# Patient Record
Sex: FEMALE | Race: WHITE
[De-identification: names, ages, dates, MRNs, and addresses within clinical notes are randomized per-mention and may not be internally consistent; named-entity substitution may affect disease eponyms.]

---

## 2018-03-07 ENCOUNTER — HOSPITAL ENCOUNTER (OUTPATIENT)
Dept: HOSPITAL 92 - L&D/OP | Age: 23
Discharge: HOME | End: 2018-03-07
Attending: OBSTETRICS & GYNECOLOGY
Payer: COMMERCIAL

## 2018-03-07 VITALS — BODY MASS INDEX: 36.2 KG/M2

## 2018-03-07 DIAGNOSIS — O99.89: Primary | ICD-10-CM

## 2018-03-07 DIAGNOSIS — M54.5: ICD-10-CM

## 2018-03-07 DIAGNOSIS — Z79.899: ICD-10-CM

## 2018-03-07 DIAGNOSIS — Z3A.33: ICD-10-CM

## 2018-03-07 DIAGNOSIS — R10.2: ICD-10-CM

## 2018-03-07 LAB
BACTERIA UR QL AUTO: (no result) HPF
CRYSTAL-AUWI FLAG: 0.2 (ref 0–15)
FFN INTERNAL QC ANALYZER: (no result)
FFN INTERNAL QC CASSETTE: (no result)
HEV IGM SER QL: 6 (ref 0–7.99)
HYALINE CASTS #/AREA URNS LPF: (no result) LPF
PATHC CAST-AUWI FLAG: 0.67 (ref 0–2.49)
RBC UR QL AUTO: (no result) HPF (ref 0–3)
SP GR UR STRIP: 1.01 (ref 1–1.04)
SPERM-AUWI FLAG: 0 (ref 0–9.9)
WBC UR QL AUTO: (no result) HPF (ref 0–3)
YEAST-AUWI FLAG: 0 (ref 0–25)

## 2018-03-07 PROCEDURE — 87660 TRICHOMONAS VAGIN DIR PROBE: CPT

## 2018-03-07 PROCEDURE — 82731 ASSAY OF FETAL FIBRONECTIN: CPT

## 2018-03-07 PROCEDURE — 87510 GARDNER VAG DNA DIR PROBE: CPT

## 2018-03-07 PROCEDURE — 87480 CANDIDA DNA DIR PROBE: CPT

## 2018-03-07 PROCEDURE — 81001 URINALYSIS AUTO W/SCOPE: CPT

## 2018-03-08 NOTE — PRG
DATE OF SERVICE:  2018

 

PRIMARY OBSTETRICIAN:  Dr. Afshin Israel.

 

CHIEF COMPLAINT:  Abdominal pains.

 

HISTORY OF PRESENT ILLNESS:  The patient is a 22-year-old  female with an intrauterine pregnancy
 at 33 weeks who is presenting to Labor and Delivery today with about 24-hour history of pelvic pain 
and lower back pain.  She reports that the pain is worse with activity and movement.  She feels it is
 primarily in her right lower pelvis.  She also feels pain in her lower back.  In addition to this pa
in with activity and movement, she feels another pain that she says comes and goes.  She is unable to
 give a very precise answer about how often she is feeling them and how long they are lasting but she
 has been experiencing since yesterday.  She has had no complications with this pregnancy thus far.  
She denies any recent illness, fever, fall.  The patient does report that she has a history of migrai
lisa and has been having intermittent headaches relieved by Tylenol and some caffeine.  She denies denton
st pain, shortness of breath.  She has had some diarrhea in the last couple days.  Denies any new zenia
hes.  Denies vaginal bleeding or change in discharge.  Denies urinary urgency or frequency.  Reports 
that she gets swelling in her lower extremities, but improves with elevation.

 

PAST MEDICAL HISTORY:  Negative.

 

PAST SURGICAL HISTORY:  Negative.

 

OBSTETRIC HISTORY:  This is her first pregnancy.

 

ALLERGIES:  No known drug allergies.

 

MEDICATIONS:  Prenatal vitamins.

 

OBSTETRIC LABS:  Unavailable at time of dictation.

 

REVIEW OF SYSTEMS:  Per HPI.

 

PHYSICAL EXAMINATION:

VITAL SIGNS:  Blood pressure is 116/68, heart rate of 99, temperature 98.2.

GENERAL:  She appears to be in no acute distress.  She is alert and oriented, cooperative and pleasan
t to interact with.

HEENT:  Normocephalic, atraumatic.

CHEST:  Clear to auscultation bilaterally.

HEART:  Regular rate and rhythm.

ABDOMEN:  Soft and gravid.  Patient has no vertebral tenderness to palpation.  No SI joint tenderness
.  She does have some lower paravertebral muscular tenderness to palpation.

EXTREMITIES:  Nontender with some mild edema bilaterally.

:  Vulva is without masses, lesions or erythema.  Vagina has minimal moisture.  Cervix is closed wi
th some white discharge.  Cervix is closed and is thick.

 

Fetal heart tracing performed for threatened labor.  Baseline is noted to be in the 130s with moderat
e long-term variability, positive accelerations, no decelerations.  Tocometer does show some possible
 irritability, but no distinct contractions seen.  UA had rare bacteria, trace leukocyte esterase, ne
gative nitrites, negative white blood cells, had a little bit of blood.  A fetal fibronectin was nega
tive.

 

ASSESSMENT AND PLAN:  The patient is a 22-year-old  female with an intrauterine pregnancy at 33 
weeks with primarily experiencing ligamentous musculoskeletal pain.  She by history sound like she is
 experiencing some Bruin Zeng contractions, but nothing that is concerning for labor.  The patient
 has been given reassurance.  Fetus has a category 1 tracing.  -3 has been collected, but results w
ill not be available until tomorrow.  The patient has been encouraged to keep her appointment with Dr Veronika Israel and has been given  labor precautions.

## 2022-09-07 ENCOUNTER — HOSPITAL ENCOUNTER (EMERGENCY)
Dept: HOSPITAL 9 - MADERS | Age: 27
LOS: 1 days | Discharge: HOME | End: 2022-09-08
Payer: SELF-PAY

## 2022-09-07 DIAGNOSIS — B34.9: ICD-10-CM

## 2022-09-07 DIAGNOSIS — Z20.822: ICD-10-CM

## 2022-09-07 DIAGNOSIS — J10.1: Primary | ICD-10-CM

## 2022-09-07 PROCEDURE — 87804 INFLUENZA ASSAY W/OPTIC: CPT

## 2022-09-07 PROCEDURE — U0005 INFEC AGEN DETEC AMPLI PROBE: HCPCS

## 2022-09-07 PROCEDURE — U0003 INFECTIOUS AGENT DETECTION BY NUCLEIC ACID (DNA OR RNA); SEVERE ACUTE RESPIRATORY SYNDROME CORONAVIRUS 2 (SARS-COV-2) (CORONAVIRUS DISEASE [COVID-19]), AMPLIFIED PROBE TECHNIQUE, MAKING USE OF HIGH THROUGHPUT TECHNOLOGIES AS DESCRIBED BY CMS-2020-01-R: HCPCS

## 2022-09-07 PROCEDURE — 99283 EMERGENCY DEPT VISIT LOW MDM: CPT

## 2022-11-03 ENCOUNTER — HOSPITAL ENCOUNTER (EMERGENCY)
Dept: HOSPITAL 9 - MADERS | Age: 27
Discharge: HOME | End: 2022-11-03
Payer: SELF-PAY

## 2022-11-03 DIAGNOSIS — M54.50: Primary | ICD-10-CM

## 2022-11-03 LAB
PREGU CONTROL BACKGROUND?: (no result)
PREGU CONTROL BAR APPEAR?: YES
SP GR UR STRIP: 1.02 (ref 1–1.04)

## 2022-11-03 PROCEDURE — 96372 THER/PROPH/DIAG INJ SC/IM: CPT

## 2022-11-03 PROCEDURE — 72131 CT LUMBAR SPINE W/O DYE: CPT

## 2022-11-03 PROCEDURE — 81025 URINE PREGNANCY TEST: CPT

## 2022-11-03 PROCEDURE — 81003 URINALYSIS AUTO W/O SCOPE: CPT

## 2022-12-09 ENCOUNTER — HOSPITAL ENCOUNTER (EMERGENCY)
Dept: HOSPITAL 9 - MADERS | Age: 27
Discharge: HOME | End: 2022-12-09
Payer: SELF-PAY

## 2022-12-09 DIAGNOSIS — U07.1: Primary | ICD-10-CM

## 2022-12-09 DIAGNOSIS — J06.9: ICD-10-CM

## 2022-12-09 PROCEDURE — U0005 INFEC AGEN DETEC AMPLI PROBE: HCPCS

## 2022-12-09 PROCEDURE — 87430 STREP A AG IA: CPT

## 2022-12-09 PROCEDURE — 87804 INFLUENZA ASSAY W/OPTIC: CPT

## 2022-12-09 PROCEDURE — U0003 INFECTIOUS AGENT DETECTION BY NUCLEIC ACID (DNA OR RNA); SEVERE ACUTE RESPIRATORY SYNDROME CORONAVIRUS 2 (SARS-COV-2) (CORONAVIRUS DISEASE [COVID-19]), AMPLIFIED PROBE TECHNIQUE, MAKING USE OF HIGH THROUGHPUT TECHNOLOGIES AS DESCRIBED BY CMS-2020-01-R: HCPCS

## 2022-12-09 PROCEDURE — 87081 CULTURE SCREEN ONLY: CPT

## 2022-12-09 PROCEDURE — 99283 EMERGENCY DEPT VISIT LOW MDM: CPT

## 2023-10-17 ENCOUNTER — HOSPITAL ENCOUNTER (EMERGENCY)
Dept: HOSPITAL 9 - MADERS | Age: 28
Discharge: HOME | End: 2023-10-17
Payer: MEDICAID

## 2023-10-17 DIAGNOSIS — J11.1: Primary | ICD-10-CM

## 2023-10-17 DIAGNOSIS — Z79.899: ICD-10-CM

## 2023-10-17 PROCEDURE — 87804 INFLUENZA ASSAY W/OPTIC: CPT

## 2023-10-17 PROCEDURE — 99283 EMERGENCY DEPT VISIT LOW MDM: CPT

## 2024-02-02 ENCOUNTER — HOSPITAL ENCOUNTER (EMERGENCY)
Dept: HOSPITAL 9 - MADERS | Age: 29
Discharge: HOME | End: 2024-02-02
Payer: SELF-PAY
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
## 2024-02-02 DIAGNOSIS — S66.012A: Primary | ICD-10-CM

## 2024-03-31 ENCOUNTER — HOSPITAL ENCOUNTER (EMERGENCY)
Dept: HOSPITAL 9 - MADERS | Age: 29
LOS: 1 days | Discharge: HOME | End: 2024-04-01
Payer: SELF-PAY

## 2024-03-31 DIAGNOSIS — F41.9: ICD-10-CM

## 2024-03-31 DIAGNOSIS — J45.901: Primary | ICD-10-CM

## 2024-03-31 DIAGNOSIS — G43.909: ICD-10-CM

## 2024-03-31 DIAGNOSIS — F31.9: ICD-10-CM

## 2024-03-31 PROCEDURE — U0002 COVID-19 LAB TEST NON-CDC: HCPCS

## 2024-03-31 PROCEDURE — 87430 STREP A AG IA: CPT

## 2024-03-31 PROCEDURE — 87804 INFLUENZA ASSAY W/OPTIC: CPT

## 2024-03-31 PROCEDURE — 87081 CULTURE SCREEN ONLY: CPT

## 2024-05-15 ENCOUNTER — HOSPITAL ENCOUNTER (EMERGENCY)
Dept: HOSPITAL 9 - MADERS | Age: 29
Discharge: HOME | End: 2024-05-15
Payer: SELF-PAY

## 2024-05-15 DIAGNOSIS — K04.7: Primary | ICD-10-CM

## 2024-05-15 PROCEDURE — 96374 THER/PROPH/DIAG INJ IV PUSH: CPT

## 2024-05-15 PROCEDURE — 96375 TX/PRO/DX INJ NEW DRUG ADDON: CPT

## 2024-08-03 ENCOUNTER — HOSPITAL ENCOUNTER (EMERGENCY)
Dept: HOSPITAL 9 - MADERS | Age: 29
Discharge: HOME | End: 2024-08-03
Payer: SELF-PAY

## 2024-08-03 DIAGNOSIS — Z79.899: ICD-10-CM

## 2024-08-03 DIAGNOSIS — N39.0: Primary | ICD-10-CM

## 2024-08-03 LAB
BACTERIA UR QL AUTO: (no result) HPF
CAUTI INDICATIONS FOR CULTURE: (no result)
PREGU CONTROL BACKGROUND?: (no result)
PREGU CONTROL BAR APPEAR?: YES
PROT UR STRIP.AUTO-MCNC: 100 MG/DL
RBC UR QL AUTO: (no result) HPF (ref 0–3)
SP GR UR STRIP: 1.02 (ref 1–1.03)
WBC UR QL AUTO: (no result) HPF (ref 0–3)

## 2024-08-03 PROCEDURE — 81025 URINE PREGNANCY TEST: CPT

## 2024-08-03 PROCEDURE — 99284 EMERGENCY DEPT VISIT MOD MDM: CPT

## 2024-08-03 PROCEDURE — 96372 THER/PROPH/DIAG INJ SC/IM: CPT

## 2024-08-03 PROCEDURE — 87077 CULTURE AEROBIC IDENTIFY: CPT

## 2024-08-03 PROCEDURE — 81001 URINALYSIS AUTO W/SCOPE: CPT

## 2024-08-03 PROCEDURE — 87086 URINE CULTURE/COLONY COUNT: CPT

## 2025-01-23 ENCOUNTER — HOSPITAL ENCOUNTER (EMERGENCY)
Dept: HOSPITAL 9 - MADERS | Age: 30
Discharge: HOME | End: 2025-01-23
Payer: SELF-PAY

## 2025-01-23 DIAGNOSIS — J06.9: Primary | ICD-10-CM

## 2025-01-23 PROCEDURE — 99283 EMERGENCY DEPT VISIT LOW MDM: CPT

## 2025-07-31 ENCOUNTER — HOSPITAL ENCOUNTER (EMERGENCY)
Dept: HOSPITAL 9 - MADERS | Age: 30
Discharge: HOME | End: 2025-07-31
Payer: SELF-PAY

## 2025-07-31 DIAGNOSIS — J45.901: Primary | ICD-10-CM

## 2025-07-31 DIAGNOSIS — Z79.51: ICD-10-CM
